# Patient Record
Sex: FEMALE | Race: BLACK OR AFRICAN AMERICAN | Employment: UNEMPLOYED | URBAN - METROPOLITAN AREA
[De-identification: names, ages, dates, MRNs, and addresses within clinical notes are randomized per-mention and may not be internally consistent; named-entity substitution may affect disease eponyms.]

---

## 2017-11-21 ENCOUNTER — HOSPITAL ENCOUNTER (OUTPATIENT)
Age: 62
Setting detail: SPECIMEN
Discharge: HOME | End: 2017-11-21
Payer: MEDICAID

## 2017-11-21 ENCOUNTER — OFFICE VISIT (OUTPATIENT)
Dept: INTERNAL MEDICINE | Age: 62
End: 2017-11-21
Payer: MEDICAID

## 2017-11-21 VITALS
HEIGHT: 65 IN | SYSTOLIC BLOOD PRESSURE: 131 MMHG | WEIGHT: 255 LBS | BODY MASS INDEX: 42.49 KG/M2 | HEART RATE: 77 BPM | DIASTOLIC BLOOD PRESSURE: 71 MMHG

## 2017-11-21 DIAGNOSIS — E78.5 HYPERLIPIDEMIA, UNSPECIFIED HYPERLIPIDEMIA TYPE: ICD-10-CM

## 2017-11-21 DIAGNOSIS — I10 ESSENTIAL HYPERTENSION: ICD-10-CM

## 2017-11-21 DIAGNOSIS — Z23 NEED FOR ZOSTER VACCINATION: ICD-10-CM

## 2017-11-21 DIAGNOSIS — Z87.898 HISTORY OF PALPITATIONS: ICD-10-CM

## 2017-11-21 DIAGNOSIS — F41.9 ANXIETY DISORDER, UNSPECIFIED TYPE: ICD-10-CM

## 2017-11-21 DIAGNOSIS — Z11.59 NEED FOR HEPATITIS C SCREENING TEST: ICD-10-CM

## 2017-11-21 DIAGNOSIS — E66.01 MORBID OBESITY WITH BMI OF 40.0-44.9, ADULT (HCC): ICD-10-CM

## 2017-11-21 DIAGNOSIS — R73.03 PREDIABETES: ICD-10-CM

## 2017-11-21 DIAGNOSIS — Z11.4 SCREENING FOR HIV (HUMAN IMMUNODEFICIENCY VIRUS): ICD-10-CM

## 2017-11-21 DIAGNOSIS — I10 ESSENTIAL HYPERTENSION: Primary | ICD-10-CM

## 2017-11-21 LAB
ALBUMIN SERPL-MCNC: 4.4 G/DL (ref 3.5–5.2)
ALBUMIN/GLOBULIN RATIO: 1.2 (ref 1–2.5)
ALP BLD-CCNC: 86 U/L (ref 35–104)
ALT SERPL-CCNC: 26 U/L (ref 5–33)
ANION GAP SERPL CALCULATED.3IONS-SCNC: 12 MMOL/L (ref 9–17)
AST SERPL-CCNC: 25 U/L
BILIRUB SERPL-MCNC: 0.41 MG/DL (ref 0.3–1.2)
BUN BLDV-MCNC: 15 MG/DL (ref 8–23)
BUN/CREAT BLD: NORMAL (ref 9–20)
CALCIUM SERPL-MCNC: 9.8 MG/DL (ref 8.6–10.4)
CHLORIDE BLD-SCNC: 99 MMOL/L (ref 98–107)
CHOLESTEROL/HDL RATIO: 3.4
CHOLESTEROL: 175 MG/DL
CO2: 28 MMOL/L (ref 20–31)
CREAT SERPL-MCNC: 0.69 MG/DL (ref 0.5–0.9)
GFR AFRICAN AMERICAN: >60 ML/MIN
GFR NON-AFRICAN AMERICAN: >60 ML/MIN
GFR SERPL CREATININE-BSD FRML MDRD: NORMAL ML/MIN/{1.73_M2}
GFR SERPL CREATININE-BSD FRML MDRD: NORMAL ML/MIN/{1.73_M2}
GLUCOSE BLD-MCNC: 86 MG/DL (ref 70–99)
HBA1C MFR BLD: 6.1 %
HCT VFR BLD CALC: 43.3 % (ref 36.3–47.1)
HDLC SERPL-MCNC: 51 MG/DL
HEMOGLOBIN: 13.3 G/DL (ref 11.9–15.1)
HEPATITIS C ANTIBODY: NONREACTIVE
HIV AG/AB: NONREACTIVE
LDL CHOLESTEROL: 84 MG/DL (ref 0–130)
MCH RBC QN AUTO: 25.5 PG (ref 25.2–33.5)
MCHC RBC AUTO-ENTMCNC: 30.7 G/DL (ref 28.4–34.8)
MCV RBC AUTO: 83.1 FL (ref 82.6–102.9)
PDW BLD-RTO: 14.7 % (ref 11.8–14.4)
PLATELET # BLD: 246 K/UL (ref 138–453)
PMV BLD AUTO: 12.1 FL (ref 8.1–13.5)
POTASSIUM SERPL-SCNC: 4.6 MMOL/L (ref 3.7–5.3)
RBC # BLD: 5.21 M/UL (ref 3.95–5.11)
SODIUM BLD-SCNC: 139 MMOL/L (ref 135–144)
TOTAL PROTEIN: 8.1 G/DL (ref 6.4–8.3)
TRIGL SERPL-MCNC: 199 MG/DL
TSH SERPL DL<=0.05 MIU/L-ACNC: 1.13 MIU/L (ref 0.3–5)
VLDLC SERPL CALC-MCNC: ABNORMAL MG/DL (ref 1–30)
WBC # BLD: 7.2 K/UL (ref 3.5–11.3)

## 2017-11-21 PROCEDURE — 84443 ASSAY THYROID STIM HORMONE: CPT

## 2017-11-21 PROCEDURE — 36415 COLL VENOUS BLD VENIPUNCTURE: CPT

## 2017-11-21 PROCEDURE — G8417 CALC BMI ABV UP PARAM F/U: HCPCS | Performed by: INTERNAL MEDICINE

## 2017-11-21 PROCEDURE — 80061 LIPID PANEL: CPT

## 2017-11-21 PROCEDURE — 1036F TOBACCO NON-USER: CPT | Performed by: INTERNAL MEDICINE

## 2017-11-21 PROCEDURE — 99204 OFFICE O/P NEW MOD 45 MIN: CPT | Performed by: INTERNAL MEDICINE

## 2017-11-21 PROCEDURE — 85027 COMPLETE CBC AUTOMATED: CPT

## 2017-11-21 PROCEDURE — 93000 ELECTROCARDIOGRAM COMPLETE: CPT | Performed by: INTERNAL MEDICINE

## 2017-11-21 PROCEDURE — 3017F COLORECTAL CA SCREEN DOC REV: CPT | Performed by: INTERNAL MEDICINE

## 2017-11-21 PROCEDURE — 83036 HEMOGLOBIN GLYCOSYLATED A1C: CPT | Performed by: INTERNAL MEDICINE

## 2017-11-21 PROCEDURE — 80053 COMPREHEN METABOLIC PANEL: CPT

## 2017-11-21 PROCEDURE — 86803 HEPATITIS C AB TEST: CPT

## 2017-11-21 PROCEDURE — G8427 DOCREV CUR MEDS BY ELIG CLIN: HCPCS | Performed by: INTERNAL MEDICINE

## 2017-11-21 PROCEDURE — 3014F SCREEN MAMMO DOC REV: CPT | Performed by: INTERNAL MEDICINE

## 2017-11-21 PROCEDURE — 87389 HIV-1 AG W/HIV-1&-2 AB AG IA: CPT

## 2017-11-21 PROCEDURE — G8484 FLU IMMUNIZE NO ADMIN: HCPCS | Performed by: INTERNAL MEDICINE

## 2017-11-21 RX ORDER — DIAZEPAM 5 MG/1
5 TABLET ORAL DAILY PRN
COMMUNITY
End: 2018-10-29

## 2017-11-21 RX ORDER — PROPRANOLOL HYDROCHLORIDE 20 MG/1
20 TABLET ORAL 2 TIMES DAILY
Qty: 180 TABLET | Refills: 1 | Status: SHIPPED | OUTPATIENT
Start: 2017-11-21 | End: 2018-01-07 | Stop reason: SDUPTHER

## 2017-11-21 RX ORDER — PROPRANOLOL HYDROCHLORIDE 20 MG/1
20 TABLET ORAL DAILY
COMMUNITY
End: 2017-11-21

## 2017-11-21 RX ORDER — ATORVASTATIN CALCIUM 10 MG/1
10 TABLET, FILM COATED ORAL DAILY
COMMUNITY
End: 2017-11-21 | Stop reason: SDUPTHER

## 2017-11-21 RX ORDER — ASPIRIN 325 MG
325 TABLET ORAL DAILY
COMMUNITY
End: 2017-11-21 | Stop reason: DRUGHIGH

## 2017-11-21 RX ORDER — LISINOPRIL 20 MG/1
20 TABLET ORAL DAILY
Qty: 30 TABLET | Refills: 3 | Status: SHIPPED | OUTPATIENT
Start: 2017-11-21 | End: 2018-01-07 | Stop reason: SDUPTHER

## 2017-11-21 RX ORDER — CYANOCOBALAMIN (VITAMIN B-12) 1000 MCG
1 TABLET, EXTENDED RELEASE ORAL
COMMUNITY
End: 2017-11-21 | Stop reason: SDUPTHER

## 2017-11-21 RX ORDER — CYANOCOBALAMIN (VITAMIN B-12) 1000 MCG
1 TABLET, EXTENDED RELEASE ORAL 2 TIMES DAILY WITH MEALS
Qty: 60 TABLET | Refills: 5 | Status: SHIPPED | OUTPATIENT
Start: 2017-11-21 | End: 2018-01-07 | Stop reason: SDUPTHER

## 2017-11-21 RX ORDER — TRIAMTERENE AND HYDROCHLOROTHIAZIDE 37.5; 25 MG/1; MG/1
1 TABLET ORAL DAILY
COMMUNITY
End: 2017-11-21 | Stop reason: SDUPTHER

## 2017-11-21 RX ORDER — LISINOPRIL 20 MG/1
20 TABLET ORAL DAILY
COMMUNITY
End: 2017-11-21 | Stop reason: SDUPTHER

## 2017-11-21 RX ORDER — ATORVASTATIN CALCIUM 10 MG/1
10 TABLET, FILM COATED ORAL DAILY
Qty: 30 TABLET | Refills: 5 | Status: SHIPPED | OUTPATIENT
Start: 2017-11-21 | End: 2018-01-07 | Stop reason: SDUPTHER

## 2017-11-21 RX ORDER — TRIAMTERENE AND HYDROCHLOROTHIAZIDE 37.5; 25 MG/1; MG/1
1 TABLET ORAL DAILY
Qty: 30 TABLET | Refills: 5 | Status: SHIPPED | OUTPATIENT
Start: 2017-11-21 | End: 2018-01-07 | Stop reason: SDUPTHER

## 2017-11-21 RX ORDER — CHLORAL HYDRATE 500 MG
3000 CAPSULE ORAL DAILY
COMMUNITY

## 2017-11-21 ASSESSMENT — ENCOUNTER SYMPTOMS
EYE REDNESS: 0
ABDOMINAL PAIN: 0
CONSTIPATION: 0
COUGH: 0
SHORTNESS OF BREATH: 0
SPUTUM PRODUCTION: 0
NAUSEA: 0
SINUS PAIN: 0
ORTHOPNEA: 0

## 2017-11-21 ASSESSMENT — PATIENT HEALTH QUESTIONNAIRE - PHQ9
1. LITTLE INTEREST OR PLEASURE IN DOING THINGS: 0
SUM OF ALL RESPONSES TO PHQ9 QUESTIONS 1 & 2: 0
2. FEELING DOWN, DEPRESSED OR HOPELESS: 0
SUM OF ALL RESPONSES TO PHQ QUESTIONS 1-9: 0

## 2017-11-21 NOTE — PATIENT INSTRUCTIONS
Follow-up appointment scheduled for Wed 3/21/18 @ 1:30 pm, AVS given to patient. It is very important that you keep your appointments, please contact us if you are unable to keep your scheduled appt    -Labs given to patient, they will have them done before their next visit.    -Printed script for Shingles Vaccine signed and given to pt  -ROR for  signed and faxed to their office--Ana Maria

## 2017-11-21 NOTE — TELEPHONE ENCOUNTER
Pt calling stating that she was just recently seen and got refills on her meidcations but the Propranolol 20 mg was missed.          Health Maintenance   Topic Date Due    Hepatitis C screen  1955    HIV screen  03/29/1969    Lipid screen  03/29/1994    Diabetes screen  03/29/1994    Breast cancer screen  03/29/2004    Colon cancer screen colonoscopy  03/29/2004    Zostavax vaccine  03/29/2014    Flu vaccine (1) 01/21/2018 (Originally 9/1/2017)    DTaP/Tdap/Td vaccine (1 - Tdap) 05/21/2018 (Originally 3/29/1973)       Hemoglobin A1C (%)   Date Value   11/21/2017 6.1             ( goal A1C is < 7)   No results found for: LABMICR  LDL Cholesterol (mg/dL)   Date Value   11/21/2017 84       (goal LDL is <100)   AST (U/L)   Date Value   11/21/2017 25     ALT (U/L)   Date Value   11/21/2017 26     BUN (mg/dL)   Date Value   11/21/2017 15     BP Readings from Last 3 Encounters:   11/21/17 131/71          (goal 120/80)      Next Visit Date:  3/21/2018    Patient Active Problem List:     Prediabetes     Essential hypertension     Hyperlipidemia     Morbid obesity with BMI of 40.0-44.9, adult ([address]

## 2017-11-21 NOTE — PROGRESS NOTES
[facility]    New Patient Note/History and Physical    Date of patient's visit: 11/21/2017    Name:  Sindhu Dhillon      YOB: 1955    Patient Care Team:  Morgan Butts MD as PCP - General (Internal Medicine)    REASON FOR VISIT: First Visit, establish care     Chief Complaint   Patient presents with   Marily Rachel Establish Care     No concerns at the time just needs to establish care     Hypertension    Hyperlipidemia    Health Maintenance     Pt refused vaccines, labs pended        HISTORY OF PRESENTING ILLNESS:    History was obtained from the patient. Sindhu Dhillon is a 60 y.o. is here to establish care. She moved here from [place]. She has a history of hypertension, mitral valve prolapse with history of palpitations, hyperlipidemia and obesity. She has a history of anxiety disorder. She takes when necessary Valium. She says she takes 1 maybe once a week. She has gained a lot of weight in the last few years. She denies any symptoms of obstructive sleep apnea. She has no history of diabetes. She had a hysterectomy for fibroid tumors. She states she does not want a colonoscopy. She's never had a colonoscopy but she's had stool tests done every year which have been negative. She had a recent mammogram done. Results for POC orders placed in visit on 11/21/17   POCT glycosylated hemoglobin (Hb A1C)   Result Value Ref Range    Hemoglobin A1C 6.1 %           PAST MEDICAL AND SURGICAL HISTORY:          Diagnosis Date    Allergic rhinitis     Hyperlipidemia     Hypertension     MVP (mitral valve prolapse)     Obesity     TIA (transient ischemic attack) 2002           Procedure Laterality Date    APPENDECTOMY  1982    HYSTERECTOMY, TOTAL ABDOMINAL  1982    One ovary left        SOCIAL HISTORY:    TOBACCO:   reports that she has never smoked. She has never used smokeless tobacco.  ETOH:   has no alcohol history on file.   DRUGS:  has no drug history on file. OCCUPATION:      ALLERGIES:      Allergies   Allergen Reactions    Sulfa Antibiotics Itching         HOME MEDICATION:      No current outpatient prescriptions on file prior to visit. No current facility-administered medications on file prior to visit. FAMILY HISTORY:          Problem Relation Age of Onset    High Blood Pressure Mother     High Blood Pressure Father        REVIEW OF SYSTEMS:    Review of Systems   Constitutional: Negative for fever and malaise/fatigue. HENT: Positive for congestion. Negative for hearing loss and sinus pain. Eyes: Negative for redness. Respiratory: Negative for cough, sputum production and shortness of breath. Cardiovascular: Positive for palpitations. Negative for chest pain, orthopnea and leg swelling. Gastrointestinal: Negative for abdominal pain, constipation and nausea. Genitourinary: Negative for dysuria and frequency. Skin: Negative for itching and rash. Neurological: Negative for dizziness, tremors, sensory change, focal weakness and loss of consciousness. Endo/Heme/Allergies: Positive for environmental allergies. Negative for polydipsia. Does not bruise/bleed easily. Psychiatric/Behavioral: Negative for depression and substance abuse. The patient is nervous/anxious. PHYSICAL EXAM:      Vitals:    11/21/17 0951   BP: 131/71   Site: Right Arm   Position: Sitting   Cuff Size: Large Adult   Pulse: 77   Weight: 255 lb (115.7 kg)   Height: 5' 5\" (1.651 m)     Wt Readings from Last 3 Encounters:   11/21/17 255 lb (115.7 kg)     Body mass index is 42.43 kg/m². Physical Exam   Constitutional: She is oriented to person, place, and time and well-developed, well-nourished, and in no distress. No distress. HENT:   Head: Normocephalic and atraumatic. Mouth/Throat: Oropharynx is clear and moist.   Eyes: Conjunctivae and EOM are normal. Pupils are equal, round, and reactive to light. No scleral icterus.    Neck: Normal MG tablet; Take 1 tablet by mouth daily  Dispense: 30 tablet; Refill: 5  - Lipid Panel; Future  - TSH with Reflex; Future  - Comprehensive Metabolic Panel; Future    4. Anxiety disorder, unspecified type    - TSH with Reflex; Future  - CBC; Future    5. Morbid obesity with BMI of 40.0-44.9, adult ([facility] [address]    - Lipid Panel; Future  - TSH with Reflex; Future    6. Need for hepatitis C screening test    - Hepatitis C Antibody; Future    7. Screening for HIV (human immunodeficiency virus)    - HIV Screen; Future    8. History of palpitations    - EKG 12 Lead; Future  - EKG 12 Lead    9. Need for zoster vaccination    - zoster vaccine live, PF, (ZOSTAVAX) 65396 UNT/0.65ML injection; Inject 0.65 mLs into the skin once for 1 dose  Dispense: 0.65 mL; Refill: 0      INSTRUCTIONS:   Return in about 4 months (around 3/21/2018). 1. Tracy Campuzano received counseling on the following healthy behaviors: nutrition, exercise and medication adherence    2. Reviewed prior labs and health maintenance. 3. Discussed use, benefit, and side effects of prescribed medications. Barriers to medication compliance addressed. All patient questions answered. Pt voiced understanding.          Sarah Escudero MD    11/21/2017, 10:13 AM

## 2018-01-07 DIAGNOSIS — R73.03 PREDIABETES: ICD-10-CM

## 2018-01-07 DIAGNOSIS — I10 ESSENTIAL HYPERTENSION: ICD-10-CM

## 2018-01-07 DIAGNOSIS — E78.5 HYPERLIPIDEMIA, UNSPECIFIED HYPERLIPIDEMIA TYPE: ICD-10-CM

## 2018-01-08 RX ORDER — TRIAMTERENE AND HYDROCHLOROTHIAZIDE 37.5; 25 MG/1; MG/1
TABLET ORAL
Qty: 30 TABLET | Refills: 0 | Status: SHIPPED | OUTPATIENT
Start: 2018-01-08 | End: 2018-03-21 | Stop reason: SDUPTHER

## 2018-01-08 RX ORDER — ATORVASTATIN CALCIUM 10 MG/1
TABLET, FILM COATED ORAL
Qty: 30 TABLET | Refills: 0 | Status: SHIPPED | OUTPATIENT
Start: 2018-01-08 | End: 2018-03-21 | Stop reason: SDUPTHER

## 2018-01-08 RX ORDER — PROPRANOLOL HYDROCHLORIDE 20 MG/1
TABLET ORAL
Qty: 60 TABLET | Refills: 0 | Status: SHIPPED | OUTPATIENT
Start: 2018-01-08 | End: 2018-03-21 | Stop reason: SDUPTHER

## 2018-01-08 RX ORDER — LISINOPRIL 20 MG/1
TABLET ORAL
Qty: 30 TABLET | Refills: 0 | Status: SHIPPED | OUTPATIENT
Start: 2018-01-08 | End: 2018-03-21 | Stop reason: SDUPTHER

## 2018-01-08 RX ORDER — CALCIUM CITRATE/VITAMIN D3 315MG-6.25
TABLET ORAL
Qty: 60 TABLET | Refills: 0 | Status: SHIPPED | OUTPATIENT
Start: 2018-01-08 | End: 2018-03-21 | Stop reason: SDUPTHER

## 2018-01-08 NOTE — TELEPHONE ENCOUNTER
Next Visit Date:  Future Appointments  Date Time Provider Sharri Morelia   3/21/2018 1:30 PM Charlette Spring MD [address] [place] [address] [address] [facility]      refill for veramyist nasal spray   Health Maintenance   Topic Date Due    Colon cancer screen colonoscopy  03/29/2004    Zostavax vaccine  03/29/2014    Flu vaccine (1) 01/21/2018 (Originally 9/1/2017)    DTaP/Tdap/Td vaccine (1 - Tdap) 05/21/2018 (Originally 3/29/1973)    A1C test (Diabetic or Prediabetic)  11/21/2018    Potassium monitoring  11/21/2018    Creatinine monitoring  11/21/2018    Breast cancer screen  05/05/2019    Lipid screen  11/21/2022    Hepatitis C screen  Completed    HIV screen  Completed       Hemoglobin A1C (%)   Date Value   11/21/2017 6.1             ( goal A1C is < 7)   No results found for: LABMICR  LDL Cholesterol (mg/dL)   Date Value   11/21/2017 84       (goal LDL is <100)   AST (U/L)   Date Value   11/21/2017 25     ALT (U/L)   Date Value   11/21/2017 26     BUN (mg/dL)   Date Value   11/21/2017 15     BP Readings from Last 3 Encounters:   11/21/17 131/71          (goal 120/80)    All Future Testing planned in [facility]              Patient Active Problem List:     Prediabetes     Essential hypertension     Hyperlipidemia     Morbid obesity with BMI of 40.0-44.9, adult ([facility] [address]

## 2018-03-21 ENCOUNTER — OFFICE VISIT (OUTPATIENT)
Dept: INTERNAL MEDICINE | Age: 63
End: 2018-03-21
Payer: MEDICAID

## 2018-03-21 VITALS
BODY MASS INDEX: 40.65 KG/M2 | HEART RATE: 76 BPM | WEIGHT: 244 LBS | SYSTOLIC BLOOD PRESSURE: 126 MMHG | HEIGHT: 65 IN | DIASTOLIC BLOOD PRESSURE: 74 MMHG

## 2018-03-21 DIAGNOSIS — R73.03 PREDIABETES: ICD-10-CM

## 2018-03-21 DIAGNOSIS — J06.9 UPPER RESPIRATORY TRACT INFECTION, UNSPECIFIED TYPE: Primary | ICD-10-CM

## 2018-03-21 DIAGNOSIS — E78.5 HYPERLIPIDEMIA, UNSPECIFIED HYPERLIPIDEMIA TYPE: ICD-10-CM

## 2018-03-21 DIAGNOSIS — E66.01 MORBID OBESITY WITH BMI OF 40.0-44.9, ADULT (HCC): ICD-10-CM

## 2018-03-21 DIAGNOSIS — I10 ESSENTIAL HYPERTENSION: ICD-10-CM

## 2018-03-21 PROCEDURE — G8427 DOCREV CUR MEDS BY ELIG CLIN: HCPCS | Performed by: INTERNAL MEDICINE

## 2018-03-21 PROCEDURE — 99213 OFFICE O/P EST LOW 20 MIN: CPT | Performed by: INTERNAL MEDICINE

## 2018-03-21 PROCEDURE — 3014F SCREEN MAMMO DOC REV: CPT | Performed by: INTERNAL MEDICINE

## 2018-03-21 PROCEDURE — 1036F TOBACCO NON-USER: CPT | Performed by: INTERNAL MEDICINE

## 2018-03-21 PROCEDURE — 99213 OFFICE O/P EST LOW 20 MIN: CPT

## 2018-03-21 PROCEDURE — G8417 CALC BMI ABV UP PARAM F/U: HCPCS | Performed by: INTERNAL MEDICINE

## 2018-03-21 PROCEDURE — G8484 FLU IMMUNIZE NO ADMIN: HCPCS | Performed by: INTERNAL MEDICINE

## 2018-03-21 PROCEDURE — 3017F COLORECTAL CA SCREEN DOC REV: CPT | Performed by: INTERNAL MEDICINE

## 2018-03-21 RX ORDER — CETIRIZINE HYDROCHLORIDE 10 MG/1
TABLET ORAL
Qty: 30 TABLET | Refills: 5 | Status: SHIPPED | OUTPATIENT
Start: 2018-03-21 | End: 2018-10-24 | Stop reason: SDUPTHER

## 2018-03-21 RX ORDER — ATORVASTATIN CALCIUM 10 MG/1
TABLET, FILM COATED ORAL
Qty: 30 TABLET | Refills: 5 | Status: SHIPPED | OUTPATIENT
Start: 2018-03-21 | End: 2018-10-24 | Stop reason: SDUPTHER

## 2018-03-21 RX ORDER — TRIAMTERENE AND HYDROCHLOROTHIAZIDE 37.5; 25 MG/1; MG/1
TABLET ORAL
Qty: 30 TABLET | Refills: 5 | Status: SHIPPED | OUTPATIENT
Start: 2018-03-21 | End: 2018-10-24 | Stop reason: SDUPTHER

## 2018-03-21 RX ORDER — PROPRANOLOL HYDROCHLORIDE 20 MG/1
TABLET ORAL
Qty: 60 TABLET | Refills: 5 | Status: SHIPPED | OUTPATIENT
Start: 2018-03-21 | End: 2018-10-24 | Stop reason: SDUPTHER

## 2018-03-21 RX ORDER — LISINOPRIL 20 MG/1
TABLET ORAL
Qty: 30 TABLET | Refills: 5 | Status: SHIPPED | OUTPATIENT
Start: 2018-03-21 | End: 2018-10-24 | Stop reason: SDUPTHER

## 2018-03-21 RX ORDER — CYANOCOBALAMIN (VITAMIN B-12) 1000 MCG
TABLET, EXTENDED RELEASE ORAL
Qty: 60 TABLET | Refills: 5 | Status: SHIPPED | OUTPATIENT
Start: 2018-03-21

## 2018-03-21 ASSESSMENT — ENCOUNTER SYMPTOMS
NAUSEA: 0
CONSTIPATION: 0
SORE THROAT: 0
COUGH: 1
SINUS PAIN: 0
ABDOMINAL PAIN: 0
SHORTNESS OF BREATH: 0
SPUTUM PRODUCTION: 1

## 2018-03-21 NOTE — PROGRESS NOTES
Body mass index is 40.6 kg/m². BP Readings from Last 3 Encounters:   03/21/18 126/74   11/21/17 131/71        Wt Readings from Last 3 Encounters:   03/21/18 244 lb (110.7 kg)   11/21/17 255 lb (115.7 kg)       Physical Exam      HENT:  Normocephalic, Atraumatic, Bilateral TM normal, Oropharynx moist, No oral exudates, Nose congested. Neck- Normal range of motion, No tenderness, Supple, No stridor. Eyes:  PERRL, EOMI, Conjunctiva normal, No discharge. Respiratory:  Normal breath sounds, No respiratory distress, No wheezing, No chest tenderness. Cardiovascular:  Normal heart rate, Normal rhythm, No murmurs, No rubs, No gallops. GI:  Bowel sounds normal, Soft, No tenderness  Musculoskeletal:  Intact distal pulses, No edema, No tenderness, Back- No tenderness. Integument:  Warm, Dry, No erythema, No rash. Lymphatic:  No lymphadenopathy noted. Neurologic:  Alert & oriented x 3, Normal motor function, Normal sensory function, No focal deficits noted.        LABORATORY FINDINGS:    CBC:  Lab Results   Component Value Date    WBC 7.2 11/21/2017    HGB 13.3 11/21/2017     11/21/2017     BMP:    Lab Results   Component Value Date     11/21/2017    K 4.6 11/21/2017    CL 99 11/21/2017    CO2 28 11/21/2017    BUN 15 11/21/2017    CREATININE 0.69 11/21/2017    GLUCOSE 86 11/21/2017     HEMOGLOBIN A1C:   Lab Results   Component Value Date    LABA1C 6.1 11/21/2017     MICROALBUMIN URINE: No results found for: MICROALBUR  FASTING LIPID PANEL:  Lab Results   Component Value Date    CHOL 175 11/21/2017    HDL 51 11/21/2017    TRIG 199 (H) 11/21/2017     Lab Results   Component Value Date    LDLCHOLESTEROL 84 11/21/2017       LIVER PROFILE:  Lab Results   Component Value Date    ALT 26 11/21/2017    AST 25 11/21/2017    PROT 8.1 11/21/2017    BILITOT 0.41 11/21/2017    LABALBU 4.4 11/21/2017      THYROID FUNCTION:   Lab Results   Component Value Date    TSH 1.13 11/21/2017      URINE ANALYSIS: No

## 2018-03-21 NOTE — PATIENT INSTRUCTIONS
Your medications for this visit were escribed to your preferred pharmacy. OC-Light hemoccult collection kit given to patient and procedure explained. Avs was given and reviewed appt card given with next appt. It is very important that you keep this appointment, if you need to cancel please call [phone] with any questions.  MM      We will call you with an appointment in 6 months

## 2018-03-22 ENCOUNTER — TELEPHONE (OUTPATIENT)
Dept: INTERNAL MEDICINE | Age: 63
End: 2018-03-22

## 2018-03-22 NOTE — TELEPHONE ENCOUNTER
Fax received from pharmacy stating that Flonase was not covered by insurance due to it being OTC    PC to pt--spoke with her and let her know that she will need to purchase OTC Flonase-- she voiced understanding

## 2018-04-09 ENCOUNTER — TELEPHONE (OUTPATIENT)
Dept: INTERNAL MEDICINE | Age: 63
End: 2018-04-09

## 2018-05-22 LAB
CONTROL: NORMAL
HEMOCCULT STL QL: NEGATIVE

## 2018-05-23 ENCOUNTER — HOSPITAL ENCOUNTER (OUTPATIENT)
Age: 63
Discharge: HOME | End: 2018-05-23
Payer: MEDICAID

## 2018-05-23 DIAGNOSIS — Z12.12 SCREENING FOR COLORECTAL CANCER: Primary | ICD-10-CM

## 2018-05-23 DIAGNOSIS — Z12.11 SCREENING FOR COLORECTAL CANCER: Primary | ICD-10-CM

## 2018-05-23 PROCEDURE — 82274 ASSAY TEST FOR BLOOD FECAL: CPT | Performed by: INTERNAL MEDICINE

## 2018-10-29 ENCOUNTER — OFFICE VISIT (OUTPATIENT)
Dept: INTERNAL MEDICINE | Age: 63
End: 2018-10-29
Payer: MEDICAID

## 2018-10-29 ENCOUNTER — HOSPITAL ENCOUNTER (OUTPATIENT)
Age: 63
Setting detail: SPECIMEN
Discharge: HOME | End: 2018-10-29
Payer: MEDICAID

## 2018-10-29 VITALS
HEIGHT: 65 IN | SYSTOLIC BLOOD PRESSURE: 132 MMHG | DIASTOLIC BLOOD PRESSURE: 86 MMHG | BODY MASS INDEX: 41.82 KG/M2 | WEIGHT: 251 LBS | HEART RATE: 71 BPM

## 2018-10-29 DIAGNOSIS — R21 RASH: ICD-10-CM

## 2018-10-29 DIAGNOSIS — I10 ESSENTIAL HYPERTENSION: Primary | ICD-10-CM

## 2018-10-29 DIAGNOSIS — E78.5 HYPERLIPIDEMIA, UNSPECIFIED HYPERLIPIDEMIA TYPE: ICD-10-CM

## 2018-10-29 DIAGNOSIS — Z12.39 BREAST CANCER SCREENING: ICD-10-CM

## 2018-10-29 DIAGNOSIS — E66.01 MORBID OBESITY WITH BMI OF 40.0-44.9, ADULT (HCC): ICD-10-CM

## 2018-10-29 DIAGNOSIS — I10 ESSENTIAL HYPERTENSION: ICD-10-CM

## 2018-10-29 DIAGNOSIS — R73.03 PREDIABETES: ICD-10-CM

## 2018-10-29 LAB
ALBUMIN SERPL-MCNC: 4.4 G/DL (ref 3.5–5.2)
ALBUMIN/GLOBULIN RATIO: 1.1 (ref 1–2.5)
ALP BLD-CCNC: 78 U/L (ref 35–104)
ALT SERPL-CCNC: 19 U/L (ref 5–33)
ANION GAP SERPL CALCULATED.3IONS-SCNC: 14 MMOL/L (ref 9–17)
AST SERPL-CCNC: 22 U/L
BILIRUB SERPL-MCNC: 0.41 MG/DL (ref 0.3–1.2)
BUN BLDV-MCNC: 19 MG/DL (ref 8–23)
BUN/CREAT BLD: NORMAL (ref 9–20)
CALCIUM SERPL-MCNC: 9.9 MG/DL (ref 8.6–10.4)
CHLORIDE BLD-SCNC: 101 MMOL/L (ref 98–107)
CHOLESTEROL/HDL RATIO: 3
CHOLESTEROL: 159 MG/DL
CO2: 28 MMOL/L (ref 20–31)
CREAT SERPL-MCNC: 0.73 MG/DL (ref 0.5–0.9)
CREATININE URINE: 240.1 MG/DL (ref 28–217)
GFR AFRICAN AMERICAN: >60 ML/MIN
GFR NON-AFRICAN AMERICAN: >60 ML/MIN
GFR SERPL CREATININE-BSD FRML MDRD: NORMAL ML/MIN/{1.73_M2}
GFR SERPL CREATININE-BSD FRML MDRD: NORMAL ML/MIN/{1.73_M2}
GLUCOSE BLD-MCNC: 87 MG/DL (ref 70–99)
HBA1C MFR BLD: 6.1 %
HDLC SERPL-MCNC: 53 MG/DL
LDL CHOLESTEROL: 75 MG/DL (ref 0–130)
MICROALBUMIN/CREAT 24H UR: 29 MG/L
MICROALBUMIN/CREAT UR-RTO: 12 MCG/MG CREAT
POTASSIUM SERPL-SCNC: 4.2 MMOL/L (ref 3.7–5.3)
SODIUM BLD-SCNC: 143 MMOL/L (ref 135–144)
TOTAL PROTEIN: 8.3 G/DL (ref 6.4–8.3)
TRIGL SERPL-MCNC: 157 MG/DL
VLDLC SERPL CALC-MCNC: ABNORMAL MG/DL (ref 1–30)

## 2018-10-29 PROCEDURE — 3017F COLORECTAL CA SCREEN DOC REV: CPT | Performed by: INTERNAL MEDICINE

## 2018-10-29 PROCEDURE — 80061 LIPID PANEL: CPT

## 2018-10-29 PROCEDURE — 36415 COLL VENOUS BLD VENIPUNCTURE: CPT

## 2018-10-29 PROCEDURE — 99211 OFF/OP EST MAY X REQ PHY/QHP: CPT | Performed by: INTERNAL MEDICINE

## 2018-10-29 PROCEDURE — 83036 HEMOGLOBIN GLYCOSYLATED A1C: CPT | Performed by: INTERNAL MEDICINE

## 2018-10-29 PROCEDURE — 99213 OFFICE O/P EST LOW 20 MIN: CPT | Performed by: INTERNAL MEDICINE

## 2018-10-29 PROCEDURE — 82043 UR ALBUMIN QUANTITATIVE: CPT

## 2018-10-29 PROCEDURE — 80053 COMPREHEN METABOLIC PANEL: CPT

## 2018-10-29 PROCEDURE — G8427 DOCREV CUR MEDS BY ELIG CLIN: HCPCS | Performed by: INTERNAL MEDICINE

## 2018-10-29 PROCEDURE — G8417 CALC BMI ABV UP PARAM F/U: HCPCS | Performed by: INTERNAL MEDICINE

## 2018-10-29 PROCEDURE — 82570 ASSAY OF URINE CREATININE: CPT

## 2018-10-29 PROCEDURE — G8484 FLU IMMUNIZE NO ADMIN: HCPCS | Performed by: INTERNAL MEDICINE

## 2018-10-29 PROCEDURE — 1036F TOBACCO NON-USER: CPT | Performed by: INTERNAL MEDICINE

## 2018-10-29 RX ORDER — TRIAMCINOLONE ACETONIDE 0.25 MG/ML
LOTION TOPICAL 2 TIMES DAILY
Qty: 100 ML | Refills: 1 | Status: SHIPPED | OUTPATIENT
Start: 2018-10-29

## 2018-10-29 ASSESSMENT — PATIENT HEALTH QUESTIONNAIRE - PHQ9
1. LITTLE INTEREST OR PLEASURE IN DOING THINGS: 0
SUM OF ALL RESPONSES TO PHQ9 QUESTIONS 1 & 2: 0
SUM OF ALL RESPONSES TO PHQ QUESTIONS 1-9: 0
2. FEELING DOWN, DEPRESSED OR HOPELESS: 0
SUM OF ALL RESPONSES TO PHQ QUESTIONS 1-9: 0

## 2018-10-29 ASSESSMENT — ENCOUNTER SYMPTOMS
BLOOD IN STOOL: 0
ABDOMINAL PAIN: 0
SHORTNESS OF BREATH: 0
CONSTIPATION: 0
WHEEZING: 0
NAUSEA: 0
COUGH: 0

## 2018-10-29 NOTE — PROGRESS NOTES
[facility]    Progress Note    Date of patient's visit: 10/29/2018    Patient's Name:  Maciej Perdomo    YOB: 1955            Patient Care Team:  Mamadou Ceballos MD as PCP - General (Internal Medicine)  Mamadou Ceballos MD as PCP - [facility] Attributed Provider    REASON FOR VISIT: Routine outpatient follow     Chief Complaint   Patient presents with    Hypertension     follow up    Diabetes     follow up   [address] [facility]     pt state that she need chris order pt refused vaccines    Eczema     pt state that she need cream she is having some itching and redness         HISTORY OF PRESENT ILLNESS:    History was obtained from the patient. Maciej Perdomo is a 58 y.o. is here for Follow-up on her chronic medical problems including hypertension, hyperlipidemia, prediabetes and morbid obesity. She has gained back some of the weight she had lost last time. Diet has not been as good and she is not active. No new complaints. She is compliant with medications. She is refusing every vaccination. She does not believe in vaccinations. She also refuses colonoscopy. She did do a stool FIT test which was negative. She denies constipation or blood in her stools. She is complaining of a rash on her elbow and forearm that she gets every year. She was given Clotrimazole cream in the past which has not really helped. It is itchy.       Results for POC orders placed in visit on 10/29/18   POCT glycosylated hemoglobin (Hb A1C)   Result Value Ref Range    Hemoglobin A1C 6.1 %       Past Medical History:   Diagnosis Date    Allergic rhinitis     Anxiety     Hyperlipidemia     Hypertension     MVP (mitral valve prolapse)     Obesity     TIA (transient ischemic attack) 2002    TIA (transient ischemic attack)        Past Surgical History:   Procedure Laterality Date    APPENDECTOMY  1982    HYSTERECTOMY, TOTAL ABDOMINAL  1982    One ovary left cough, shortness of breath and wheezing. Cardiovascular: Negative for chest pain, palpitations and leg swelling. Gastrointestinal: Negative for abdominal pain, blood in stool, constipation and nausea. Endocrine: Negative for polydipsia and polyuria. Genitourinary: Negative for dysuria, frequency and urgency. Skin: Positive for rash. Negative for pallor and wound. Allergic/Immunologic: Negative for environmental allergies and immunocompromised state. Neurological: Negative for dizziness, syncope, weakness and headaches. Hematological: Negative for adenopathy. Does not bruise/bleed easily. Psychiatric/Behavioral: Negative for dysphoric mood. The patient is nervous/anxious. PHYSICAL EXAM:     Vitals:    10/29/18 0829   BP: 132/86   Site: Right Upper Arm   Position: Sitting   Cuff Size: Large Adult   Pulse: 71   Weight: 251 lb (113.9 kg)   Height: 5' 5\" (1.651 m)     Body mass index is 41.77 kg/m². BP Readings from Last 3 Encounters:   10/29/18 132/86   03/21/18 126/74   11/21/17 131/71        Wt Readings from Last 3 Encounters:   10/29/18 251 lb (113.9 kg)   03/21/18 244 lb (110.7 kg)   11/21/17 255 lb (115.7 kg)       Physical Exam      HENT: Normocephalic, Atraumatic,  Oropharynx moist, No oral exudates, Nose normal. Neck- Normal range of motion, No tenderness, Supple. Eyes:  PERRL, EOMI, Conjunctiva normal, No discharge. Respiratory:  Normalbreath sounds, No respiratory distress, No wheezing, No chest tenderness. Cardiovascular:  Normal heart rate, Normal rhythm, No murmurs  GI:  Bowel sounds normal, Soft, No tenderness. Musculoskeletal:  Intact distal pulses, No edema, No tenderness, Back- Notenderness. Integument:  Warm, Dry, No erythema,eczema on elbow area  Lymphatic:  No lymphadenopathy noted. Neurologic:  Alert & oriented x 3, Normal motor function, Normal sensory function, No focal deficits noted.    Psychiatric:  Affect normal.    LABORATORY FINDINGS:    CBC:  Lab

## 2018-10-29 NOTE — PROGRESS NOTES
Visit Information    Have you changed or started any medications since your last visit including any over-the-counter medicines, vitamins, or herbal medicines? no   Are you having any side effects from any of your medications? -  no  Have you stopped taking any of your medications? Is so, why? -  no    Have you seen any other physician or provider since your last visit? No  Have you had any other diagnostic tests since your last visit? No  Have you been seen in the emergency room and/or had an admission to a hospital since we last saw you? No  Have you had your routine dental cleaning in the past 6 months? no    Have you activated your [employer] account? If not, what are your barriers?  No: declined     Patient Care Team:  Donna Gomez MD as PCP - General (Internal Medicine)  Donna Gomez MD as PCP - [facility] Attributed Provider    Medical History Review  Past Medical, Family, and Social History reviewed and does not contribute to the patient presenting condition    Health Maintenance   Topic Date Due    DTaP/Tdap/Td vaccine (1 - Tdap) 03/29/1973    Shingles Vaccine (1 of 2 - 2 Dose Series) 03/29/2004    Flu vaccine (1) 09/01/2018    A1C test (Diabetic or Prediabetic)  11/21/2018    Potassium monitoring  11/21/2018    Creatinine monitoring  11/21/2018    Breast cancer screen  05/05/2019    Colon Cancer Screen FIT/FOBT  05/22/2019    Lipid screen  11/21/2022    Hepatitis C screen  Completed    HIV screen  Completed

## 2018-11-04 ASSESSMENT — ENCOUNTER SYMPTOMS: EYE REDNESS: 0

## 2019-01-28 ENCOUNTER — TELEPHONE (OUTPATIENT)
Dept: INTERNAL MEDICINE | Age: 64
End: 2019-01-28

## 2019-02-02 DIAGNOSIS — I10 ESSENTIAL HYPERTENSION: ICD-10-CM

## 2019-02-05 RX ORDER — LISINOPRIL 20 MG/1
TABLET ORAL
Qty: 30 TABLET | Refills: 1 | Status: SHIPPED | OUTPATIENT
Start: 2019-02-05

## 2019-05-27 DIAGNOSIS — R73.03 PREDIABETES: ICD-10-CM

## 2019-05-27 DIAGNOSIS — E78.5 HYPERLIPIDEMIA, UNSPECIFIED HYPERLIPIDEMIA TYPE: ICD-10-CM

## 2019-05-29 RX ORDER — ATORVASTATIN CALCIUM 10 MG/1
TABLET, FILM COATED ORAL
Qty: 30 TABLET | Refills: 2 | Status: SHIPPED | OUTPATIENT
Start: 2019-05-29

## 2019-07-29 ENCOUNTER — TELEPHONE (OUTPATIENT)
Dept: INTERNAL MEDICINE | Age: 64
End: 2019-07-29

## 2019-07-29 NOTE — LETTER
[address] [facility] [address] [address]  Phone: [phone]  Fax: [phone]    Anel Holly MD        July 29, 2019    [address] [place] [id number]      Dear Amanda Hahn: We are sending this letter because your PCP ordered Mammogram for you to have done at your last visit here and they have not yet been completed. If you can please come to our office on the 2nd floor to  your orders to have them compelted. If you do not have a follow-up appointment scheduled you can either contact the office to make an appointment with us or you can make one when you come in to pick-up your orders. If you have any questions or concerns, please don't hesitate to call.     Sincerely,        Anel Holly MD